# Patient Record
Sex: MALE | Race: WHITE | NOT HISPANIC OR LATINO | Employment: STUDENT | URBAN - NONMETROPOLITAN AREA
[De-identification: names, ages, dates, MRNs, and addresses within clinical notes are randomized per-mention and may not be internally consistent; named-entity substitution may affect disease eponyms.]

---
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
## 2022-01-29 ENCOUNTER — HEALTH MAINTENANCE LETTER (OUTPATIENT)
Age: 22
End: 2022-01-29

## 2022-09-17 ENCOUNTER — HEALTH MAINTENANCE LETTER (OUTPATIENT)
Age: 22
End: 2022-09-17

## 2023-05-06 ENCOUNTER — HEALTH MAINTENANCE LETTER (OUTPATIENT)
Age: 23
End: 2023-05-06

## 2023-07-07 ENCOUNTER — OFFICE VISIT (OUTPATIENT)
Dept: FAMILY MEDICINE | Facility: OTHER | Age: 23
End: 2023-07-07
Attending: FAMILY MEDICINE
Payer: COMMERCIAL

## 2023-07-07 VITALS
SYSTOLIC BLOOD PRESSURE: 124 MMHG | OXYGEN SATURATION: 98 % | TEMPERATURE: 96.9 F | BODY MASS INDEX: 22.58 KG/M2 | WEIGHT: 170.4 LBS | HEIGHT: 73 IN | HEART RATE: 94 BPM | RESPIRATION RATE: 18 BRPM | DIASTOLIC BLOOD PRESSURE: 80 MMHG

## 2023-07-07 DIAGNOSIS — Z11.3 SCREENING EXAMINATION FOR SEXUALLY TRANSMITTED DISEASE: ICD-10-CM

## 2023-07-07 DIAGNOSIS — J20.9 ACUTE BRONCHITIS, UNSPECIFIED ORGANISM: Primary | ICD-10-CM

## 2023-07-07 LAB
C TRACH DNA SPEC QL PROBE+SIG AMP: NEGATIVE
C TRACH DNA SPEC QL PROBE+SIG AMP: NEGATIVE
N GONORRHOEA DNA SPEC QL NAA+PROBE: NEGATIVE
N GONORRHOEA DNA SPEC QL NAA+PROBE: NEGATIVE

## 2023-07-07 PROCEDURE — 36415 COLL VENOUS BLD VENIPUNCTURE: CPT | Mod: ZL | Performed by: FAMILY MEDICINE

## 2023-07-07 PROCEDURE — 99214 OFFICE O/P EST MOD 30 MIN: CPT | Performed by: FAMILY MEDICINE

## 2023-07-07 PROCEDURE — 87389 HIV-1 AG W/HIV-1&-2 AB AG IA: CPT | Mod: ZL | Performed by: FAMILY MEDICINE

## 2023-07-07 PROCEDURE — 87591 N.GONORRHOEAE DNA AMP PROB: CPT | Mod: ZL | Performed by: FAMILY MEDICINE

## 2023-07-07 PROCEDURE — 86803 HEPATITIS C AB TEST: CPT | Mod: ZL | Performed by: FAMILY MEDICINE

## 2023-07-07 RX ORDER — AZITHROMYCIN 250 MG/1
TABLET, FILM COATED ORAL
Qty: 6 TABLET | Refills: 0 | Status: SHIPPED | OUTPATIENT
Start: 2023-07-07 | End: 2023-07-12

## 2023-07-07 ASSESSMENT — PAIN SCALES - GENERAL: PAINLEVEL: NO PAIN (0)

## 2023-07-07 ASSESSMENT — ENCOUNTER SYMPTOMS: COUGH: 1

## 2023-07-07 NOTE — PROGRESS NOTES
"  Assessment & Plan     Acute bronchitis, unspecified organism  Given persistent cough for more than 4 weeks and rhonchi on exam today, opted to proceed with course of azithromycin for acute bronchitis.  Discussed other supportive cares.  Recommend a mask when traveling.  - azithromycin (ZITHROMAX) 250 MG tablet; Take 2 tablets (500 mg) by mouth daily for 1 day, THEN 1 tablet (250 mg) daily for 4 days.    Screening examination for sexually transmitted disease  Oropharyngeal swab was negative for gonorrhea and chlamydia.  Proceeded with urine test as well which was negative.  HIV hepatitis C pending.  Continue use of condoms.  - Chlamydia trachomatis/Neisseria gonorrhoeae by PCR; Future  - Chlamydia trachomatis/Neisseria gonorrhoeae by PCR; Future  - HIV Antigen Antibody Combo; Future  - Hepatitis C Screen Reflex to HCV RNA Quant and Genotype; Future  - Chlamydia trachomatis/Neisseria gonorrhoeae by PCR  - HIV Antigen Antibody Combo  - Hepatitis C Screen Reflex to HCV RNA Quant and Genotype  - Chlamydia trachomatis/Neisseria gonorrhoeae by PCR          No follow-ups on file.    Johanna Dominguez MD  [facility] is a 23 year old, presenting for the following health issues:  Cough  Cough       23-year-old male presents with a few concerns.  He has had an intermittent cough for the past 4 weeks.  It seems to wax and wane.  It can be harsh and productive and then go away for a few days.  He denies shortness of breath or wheezing.  He will be traveling internationally tomorrow.    He is also requesting STI screen.  New male sexual partner.  He uses condoms.  Denies current symptoms.      Review of Systems   Respiratory: Positive for cough.             Objective    /80   Pulse 94   Temp 96.9  F (36.1  C) (Tympanic)   Resp 18   Ht 1.861 m (6' 1.25\")   Wt 77.3 kg (170 lb 6.4 oz)   SpO2 98%   BMI 22.33 kg/m    Body mass index is 22.33 kg/m .  Physical Exam  HENT:     "  Right Ear: Tympanic membrane normal.      Left Ear: Tympanic membrane normal.      Nose: Nose normal. No congestion.      Mouth/Throat:      Mouth: Mucous membranes are moist.      Pharynx: No oropharyngeal exudate or posterior oropharyngeal erythema.   Cardiovascular:      Rate and Rhythm: Normal rate.   Pulmonary:      Breath sounds: Rhonchi present.   Musculoskeletal:      Cervical back: Normal range of motion. No rigidity.   Lymphadenopathy:      Cervical: No cervical adenopathy.   Neurological:      Mental Status: He is alert.     Few coarse rhonchi on the left.  No wheezing.      Results for orders placed or performed in visit on 07/07/23 (from the past 24 hour(s))   Chlamydia trachomatis/Neisseria gonorrhoeae by PCR    Specimen: Throat; Swab   Result Value Ref Range    Chlamydia Trachomatis Negative Negative    Neisseria gonorrhoeae Negative Negative    Narrative    Assay performed using [employer] real-time, reverse-transcriptase PCR.   Chlamydia trachomatis/Neisseria gonorrhoeae by PCR    Specimen: Urine, Voided   Result Value Ref Range    Chlamydia Trachomatis Negative Negative    Neisseria gonorrhoeae Negative Negative    Narrative    Assay performed using [employer] real-time, reverse-transcriptase PCR.

## 2023-07-07 NOTE — NURSING NOTE
Patient is here for concerns of on going cough. Has had for past month, feels is getting better. He would also like to have sti testing.     Medication Reconciliation: complete      Ivelisse Arevalo LPN 7/7/2023 8:23 AM       Advance care directive on file? no  Advance care directive provided to patient? no       Ivelisse Arevalo LPN

## 2023-07-08 LAB
HCV AB SERPL QL IA: NONREACTIVE
HIV 1+2 AB+HIV1 P24 AG SERPL QL IA: NONREACTIVE

## 2025-06-26 SDOH — HEALTH STABILITY: PHYSICAL HEALTH: ON AVERAGE, HOW MANY DAYS PER WEEK DO YOU ENGAGE IN MODERATE TO STRENUOUS EXERCISE (LIKE A BRISK WALK)?: 7 DAYS

## 2025-06-26 SDOH — HEALTH STABILITY: PHYSICAL HEALTH: ON AVERAGE, HOW MANY MINUTES DO YOU ENGAGE IN EXERCISE AT THIS LEVEL?: 90 MIN

## 2025-06-26 ASSESSMENT — SOCIAL DETERMINANTS OF HEALTH (SDOH): HOW OFTEN DO YOU GET TOGETHER WITH FRIENDS OR RELATIVES?: MORE THAN THREE TIMES A WEEK

## 2025-07-01 ENCOUNTER — OFFICE VISIT (OUTPATIENT)
Dept: FAMILY MEDICINE | Facility: OTHER | Age: 25
End: 2025-07-01
Attending: FAMILY MEDICINE
Payer: COMMERCIAL

## 2025-07-01 VITALS
DIASTOLIC BLOOD PRESSURE: 70 MMHG | WEIGHT: 178 LBS | RESPIRATION RATE: 16 BRPM | OXYGEN SATURATION: 98 % | HEIGHT: 75 IN | HEART RATE: 65 BPM | BODY MASS INDEX: 22.13 KG/M2 | SYSTOLIC BLOOD PRESSURE: 118 MMHG | TEMPERATURE: 98.3 F

## 2025-07-01 DIAGNOSIS — Z00.00 ADULT GENERAL MEDICAL EXAM: Primary | ICD-10-CM

## 2025-07-01 ASSESSMENT — PAIN SCALES - GENERAL: PAINLEVEL_OUTOF10: NO PAIN (0)

## 2025-07-01 NOTE — PROGRESS NOTES
Preventive Care Visit  [facility]  Dhruv Soto MD, Family Medicine  Jul 1, 2025      Assessment & Plan     Adult general medical exam  Healthy 25-year-old male.  Preventative care discussed.  Tetanus was due so that is given in the form of Tdap today.  I have filled out his [employer] history and physical paperwork with no restrictions.  No labs indicated.  He will follow-up in 1 to 2 years for preventative physical examination, sooner if any problems.            Counseling  Appropriate preventive services were addressed with this patient via screening, questionnaire, or discussion as appropriate for fall prevention, nutrition, physical activity, Tobacco-use cessation, social engagement, weight loss and cognition.  Checklist reviewing preventive services available has been given to the patient.  Reviewed patient's diet, addressing concerns and/or questions.             Genaro Juarez is a 25 year old, presenting for the following:  Physical (No concerns, does need paperwork filled out for traveling to [place])        7/1/2025     7:40 AM   Additional Questions   Roomed by Juli         7/1/2025   Forms   Any forms needing to be completed Yes          HPI  25-year-old male here for annual preventative physical examination.  It has been a few years since his last physical.  He needs paperwork filled out for upcoming overseas work with the [employer] program.  He will be in [employer] for the next year.  He has applied for medical schools for hopeful enrollment in the fall of 2026.  He overall is healthy and really has no active complaints or concerns.  He had been on Truvada in the past for HIV preexposure prophylaxis but has not been on that for about 3 years now.  He did graduate from [employer] just this past spring.  No other complaints.         Advance Care Planning            6/26/2025   General Health   How would you rate your overall physical health? Excellent    Feel stress (tense, anxious, or unable to sleep) Not at all         6/26/2025   Nutrition   Three or more servings of calcium each day? Yes   Diet: Regular (no restrictions)   How many servings of fruit and vegetables per day? (!) 2-3   How many sweetened beverages each day? 0-1         6/26/2025   Exercise   Days per week of moderate/strenous exercise 7 days   Average minutes spent exercising at this level 90 min         6/26/2025   Social Factors   Frequency of gathering with friends or relatives More than three times a week   Worry food won't last until get money to buy more No   Food not last or not have enough money for food? No   Do you have housing? (Housing is defined as stable permanent housing and does not include staying outside in a car, in a tent, in an abandoned building, in an overnight shelter, or couch-surfing.) Yes   Are you worried about losing your housing? No   Lack of transportation? No   Unable to get utilities (heat,electricity)? No         6/26/2025   Dental   Dentist two times every year? Yes         Today's PHQ-2 Score:       7/1/2025     7:16 AM   PHQ-2 ( 1999 [employer])   Q1: Little interest or pleasure in doing things 0   Q2: Feeling down, depressed or hopeless 0   PHQ-2 Score 0    Q1: Little interest or pleasure in doing things Not at all   Q2: Feeling down, depressed or hopeless Not at all   PHQ-2 Score 0       Patient-reported           6/26/2025   Substance Use   Alcohol more than 3/day or more than 7/wk No   Do you use any other substances recreationally? No     Social History     Tobacco Use    Smoking status: Never     Passive exposure: Past    Smokeless tobacco: Never   Vaping Use    Vaping status: Never Used   Substance Use Topics    Alcohol use: No    Drug use: No           6/26/2025   STI Screening   New sexual partner(s) since last STI/HIV test? No         6/26/2025   Contraception/Family Planning   Questions about contraception or family planning No        Reviewed and  "updated as needed this visit by Provider   Tobacco  Allergies  Meds  Problems  Med Hx  Surg Hx  Fam Hx            Past Medical History:   Diagnosis Date    Multiple benign nevi 2015    [place] of 37 or more completed weeks of gestation     Vaginal delivery 40 weeks gestation.  Birth weight 7 lbs. 12 oz.  23\" long.  Circumcised.     Past Surgical History:   Procedure Laterality Date    LASIK BILATERAL      OTHER SURGICAL HISTORY      56105.0,PAST SURGICAL HISTORY,Past Surgical History is unremarkable            Objective    Exam  /70 (BP Location: Right arm, Patient Position: Sitting, Cuff Size: Adult Regular)   Pulse 65   Temp 98.3  F (36.8  C) (Temporal)   Resp 16   Ht 1.892 m (6' 2.5\")   Wt 80.7 kg (178 lb)   SpO2 98%   BMI 22.55 kg/m     Estimated body mass index is 22.55 kg/m  as calculated from the following:    Height as of this encounter: 1.892 m (6' 2.5\").    Weight as of this encounter: 80.7 kg (178 lb).    Physical Exam  GENERAL: alert and no distress  EYES: Eyes grossly normal to inspection, PERRL and conjunctivae and sclerae normal  HENT: ear canals and TM's normal, nose and mouth without ulcers or lesions  NECK: no adenopathy, no asymmetry, masses, or scars  RESP: lungs clear to auscultation - no rales, rhonchi or wheezes  CV: regular rate and rhythm, normal S1 S2, no S3 or S4, no murmur, click or rub, no peripheral edema  ABDOMEN: soft, nontender, no hepatosplenomegaly, no masses and bowel sounds normal   (male): normal male genitalia without lesions or urethral discharge, no hernia  MS: no gross musculoskeletal defects noted, no edema  SKIN: no suspicious lesions or rashes  NEURO: Normal strength and tone, mentation intact and speech normal  PSYCH: mentation appears normal, affect normal/bright        Signed Electronically by: Dhruv Soto MD    "

## 2025-07-01 NOTE — NURSING NOTE
"Chief Complaint   Patient presents with    Physical     No concerns, does need paperwork filled out for traveling to [place]       Initial /70 (BP Location: Right arm, Patient Position: Sitting, Cuff Size: Adult Regular)   Pulse 65   Temp 98.3  F (36.8  C) (Temporal)   Resp 16   Ht 1.892 m (6' 2.5\")   Wt 80.7 kg (178 lb)   SpO2 98%   BMI 22.55 kg/m   Estimated body mass index is 22.55 kg/m  as calculated from the following:    Height as of this encounter: 1.892 m (6' 2.5\").    Weight as of this encounter: 80.7 kg (178 lb).  Medication Reconciliation: complete        Juli Edouard, TERRI   "